# Patient Record
Sex: MALE | URBAN - METROPOLITAN AREA
[De-identification: names, ages, dates, MRNs, and addresses within clinical notes are randomized per-mention and may not be internally consistent; named-entity substitution may affect disease eponyms.]

---

## 2023-06-27 ENCOUNTER — POST-OPERATIVE VISIT (OUTPATIENT)
Facility: LOCATION | Age: 73
End: 2023-06-27
Payer: COMMERCIAL

## 2023-06-27 DIAGNOSIS — Z48.810 ENCOUNTER FOR SURGICAL AFTERCARE FOLLOWING SURGERY ON A SENSE ORGAN: ICD-10-CM

## 2023-06-27 DIAGNOSIS — H40.1132 PRIMARY OPEN-ANGLE GLAUCOMA, `BILATERAL`, M`OD`ERATE STAGE: Primary | ICD-10-CM

## 2023-06-27 PROCEDURE — 99024 POSTOP FOLLOW-UP VISIT: CPT | Performed by: OPHTHALMOLOGY

## 2023-06-27 ASSESSMENT — INTRAOCULAR PRESSURE
OD: 16
OD: 17

## 2023-06-27 NOTE — IMPRESSION/PLAN
Impression: Primary open-angle glaucoma, `BILATERAL`, m`OD`erate stage. Plan: Gonio: 08/19/21  VF: 02/28/23  RNFL: 05/12/22  Optos: 08/19/21 Primary Open Angle Glaucoma OU (? angle recession OD) s/p GATT OD s/p CE/[place] OD No FH.  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
CCT:[id number]/558 Gonio open to ss 1+PTM OU OD ?otomy VF OD dense sup arc/paracentral dep OS: scat  (OD prog  OS: stable) RNFL: OD: sev thinning, OS: no thin (Stable OU) CCM Latanoprost QHS OU, Dorozolamide/Timolol BID OD, rhopressa qhs od.,  


 Recommend initial baseline OCT of the nerves, CCT, Visual Fields, and ON Photos. Careful observation of intraocular pressures, anatomical, and functional tests are recommended. Patient understands that permanant blindness can occur without adequate pressure control and observation. Compliance is strongly urged. The patient is to call back with any worsening of symptoms or concerns.

## 2023-06-27 NOTE — IMPRESSION/PLAN
Impression: S/P CE/Standard IOL / [place] OD - 1 Day. Encounter for surgical aftercare following surgery on a sense organ  Z48.810.  Plan: [facility] gtts as directed

## 2023-07-03 ENCOUNTER — POST-OPERATIVE VISIT (OUTPATIENT)
Facility: LOCATION | Age: 73
End: 2023-07-03
Payer: COMMERCIAL

## 2023-07-03 DIAGNOSIS — Z48.810 ENCOUNTER FOR SURGICAL AFTERCARE FOLLOWING SURGERY ON A SENSE ORGAN: ICD-10-CM

## 2023-07-03 DIAGNOSIS — H40.1132 PRIMARY OPEN-ANGLE GLAUCOMA, `BILATERAL`, M`OD`ERATE STAGE: Primary | ICD-10-CM

## 2023-07-03 PROCEDURE — 99024 POSTOP FOLLOW-UP VISIT: CPT | Performed by: OPHTHALMOLOGY

## 2023-07-03 RX ORDER — BRIMONIDINE TARTRATE 2 MG/ML
0.2 % SOLUTION/ DROPS OPHTHALMIC
Qty: 10 | Refills: 4 | Status: ACTIVE
Start: 2023-07-03

## 2023-07-03 ASSESSMENT — INTRAOCULAR PRESSURE
OS: 20
OS: 19
OD: 22
OD: 15

## 2023-07-03 NOTE — IMPRESSION/PLAN
Impression: S/P CE/Standard IOL / [place] OD - 7 Days. Encounter for surgical aftercare following surgery on a sense organ  Z48.810.  Plan: [facility] gtts as directed

## 2023-07-03 NOTE — IMPRESSION/PLAN
Impression: Primary open-angle glaucoma, `BILATERAL`, m`OD`erate stage. Plan: Gonio: 08/19/21  VF: 02/28/23  RNFL: 05/12/22  Optos: 08/19/21 DFE: 02/28/23 Primary Open Angle Glaucoma OU (? angle recession OD) s/p GATT OD s/p CE/[place] OD No FH.  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
CCT:[id number]/558 Gonio open to ss 1+PTM OU OD ?otomy VF OD dense sup arc/paracentral dep OS: scat  (OD prog  OS: stable) RNFL: OD: sev thinning, OS: no thin (Stable OU) CCM Latanoprost QHS OU, Dorozolamide/Timolol BID OD, rhopressa qhs od.,  [facility] gtts as directed, Start Brimonidine BID OD Recommend initial baseline OCT of the nerves, CCT, Visual Fields, and ON Photos. Careful observation of intraocular pressures, anatomical, and functional tests are recommended. Patient understands that permanant blindness can occur without adequate pressure control and observation. Compliance is strongly urged. The patient is to call back with any worsening of symptoms or concerns.

## 2023-08-29 ENCOUNTER — OFFICE VISIT (OUTPATIENT)
Facility: LOCATION | Age: 73
End: 2023-08-29
Payer: COMMERCIAL

## 2023-08-29 DIAGNOSIS — H40.1132 PRIMARY OPEN-ANGLE GLAUCOMA, BILATERAL, MODERATE STAGE: Primary | ICD-10-CM

## 2023-08-29 DIAGNOSIS — Z48.810 ENCOUNTER FOR SURGICAL AFTERCARE FOLLOWING SURGERY ON A SENSE ORGAN: ICD-10-CM

## 2023-08-29 PROCEDURE — 99024 POSTOP FOLLOW-UP VISIT: CPT | Performed by: OPHTHALMOLOGY

## 2023-08-29 ASSESSMENT — INTRAOCULAR PRESSURE
OD: 15
OS: 16
OD: 16
OS: 18

## 2024-04-02 ENCOUNTER — OFFICE VISIT (OUTPATIENT)
Facility: LOCATION | Age: 74
End: 2024-04-02
Payer: COMMERCIAL

## 2024-04-02 DIAGNOSIS — H52.13 MYOPIA, BILATERAL: ICD-10-CM

## 2024-04-02 DIAGNOSIS — H40.1132 PRIMARY OPEN-ANGLE GLAUCOMA, BILATERAL, MODERATE STAGE: Primary | ICD-10-CM

## 2024-04-02 PROCEDURE — 99213 OFFICE O/P EST LOW 20 MIN: CPT | Performed by: OPHTHALMOLOGY

## 2024-04-02 PROCEDURE — 92133 CPTRZD OPH DX IMG PST SGM ON: CPT | Performed by: OPHTHALMOLOGY

## 2024-04-02 RX ORDER — BRIMONIDINE TARTRATE 2 MG/ML
0.2 % SOLUTION/ DROPS OPHTHALMIC
Qty: 10 | Refills: 4 | Status: INACTIVE
Start: 2024-04-02 | End: 2024-06-30

## 2024-04-02 RX ORDER — NETARSUDIL 0.2 MG/ML
0.02 % SOLUTION/ DROPS OPHTHALMIC; TOPICAL
Qty: 2.5 | Refills: 4 | Status: INACTIVE
Start: 2024-04-02 | End: 2024-06-30

## 2024-04-02 RX ORDER — DORZOLAMIDE HYDROCHLORIDE AND TIMOLOL MALEATE 20; 5 MG/ML; MG/ML
SOLUTION/ DROPS OPHTHALMIC
Qty: 15 | Refills: 4 | Status: INACTIVE
Start: 2024-04-02 | End: 2024-06-30

## 2024-04-02 RX ORDER — LATANOPROST 50 UG/ML
0.005 % SOLUTION OPHTHALMIC
Qty: 2.5 | Refills: 4 | Status: INACTIVE
Start: 2024-04-02 | End: 2024-06-30

## 2024-04-02 ASSESSMENT — VISUAL ACUITY
OD: 20/25
OS: 20/25

## 2024-04-02 ASSESSMENT — KERATOMETRY
OD: 40.56
OS: 42.69

## 2024-04-02 ASSESSMENT — INTRAOCULAR PRESSURE
OD: 16
OS: 19

## 2024-11-18 ENCOUNTER — OFFICE VISIT (OUTPATIENT)
Facility: LOCATION | Age: 74
End: 2024-11-18
Payer: COMMERCIAL

## 2024-11-18 DIAGNOSIS — H04.123 DRY EYE SYNDROME OF BILATERAL LACRIMAL GLANDS: ICD-10-CM

## 2024-11-18 DIAGNOSIS — H25.812 COMBINED FORMS OF AGE-RELATED CATARACT, LEFT EYE: ICD-10-CM

## 2024-11-18 DIAGNOSIS — H26.491 OTHER SECONDARY CATARACT, RIGHT EYE: ICD-10-CM

## 2024-11-18 DIAGNOSIS — H40.1132 PRIMARY OPEN-ANGLE GLAUCOMA, `BILATERAL`, M`OD`ERATE STAGE: Primary | ICD-10-CM

## 2024-11-18 PROCEDURE — 92083 EXTENDED VISUAL FIELD XM: CPT | Performed by: OPHTHALMOLOGY

## 2024-11-18 PROCEDURE — 99214 OFFICE O/P EST MOD 30 MIN: CPT | Performed by: OPHTHALMOLOGY

## 2024-11-18 RX ORDER — BRIMONIDINE TARTRATE 2 MG/ML
0.2 % SOLUTION/ DROPS OPHTHALMIC
Qty: 10 | Refills: 4 | Status: INACTIVE
Start: 2024-11-18 | End: 2025-02-15

## 2024-11-18 ASSESSMENT — INTRAOCULAR PRESSURE
OD: 17
OS: 19
OD: 16
OS: 18

## 2025-07-28 ENCOUNTER — OFFICE VISIT (OUTPATIENT)
Facility: LOCATION | Age: 75
End: 2025-07-28
Payer: COMMERCIAL

## 2025-07-28 DIAGNOSIS — H40.1132 PRIMARY OPEN-ANGLE GLAUCOMA, `BILATERAL`, M`OD`ERATE STAGE: Primary | ICD-10-CM

## 2025-07-28 DIAGNOSIS — H04.123 DRY EYE SYNDROME OF BILATERAL LACRIMAL GLANDS: ICD-10-CM

## 2025-07-28 DIAGNOSIS — H26.491 OTHER SECONDARY CATARACT, RIGHT EYE: ICD-10-CM

## 2025-07-28 DIAGNOSIS — H25.812 COMBINED FORMS OF AGE-RELATED CATARACT, LEFT EYE: ICD-10-CM

## 2025-07-28 PROCEDURE — 99214 OFFICE O/P EST MOD 30 MIN: CPT | Performed by: OPHTHALMOLOGY

## 2025-07-28 PROCEDURE — 92133 CPTRZD OPH DX IMG PST SGM ON: CPT | Performed by: OPHTHALMOLOGY

## 2025-07-28 RX ORDER — NETARSUDIL 0.2 MG/ML
0.02 % SOLUTION/ DROPS OPHTHALMIC; TOPICAL
Qty: 7.5 | Refills: 3 | Status: ACTIVE
Start: 2025-07-28

## 2025-07-28 RX ORDER — LATANOPROST 50 UG/ML
0.005 % SOLUTION OPHTHALMIC
Qty: 15 | Refills: 3 | Status: ACTIVE
Start: 2025-07-28

## 2025-07-28 RX ORDER — DORZOLAMIDE HYDROCHLORIDE AND TIMOLOL MALEATE 20; 5 MG/ML; MG/ML
SOLUTION/ DROPS OPHTHALMIC
Qty: 15 | Refills: 4 | Status: INACTIVE
Start: 2025-07-28 | End: 2025-10-25

## 2025-07-28 RX ORDER — BRIMONIDINE TARTRATE 2 MG/ML
0.2 % SOLUTION/ DROPS OPHTHALMIC
Qty: 10 | Refills: 4 | Status: ACTIVE
Start: 2025-07-28

## 2025-07-28 ASSESSMENT — INTRAOCULAR PRESSURE
OD: 16
OS: 19
OD: 19
OS: 18

## 2025-08-18 ENCOUNTER — OFFICE VISIT (OUTPATIENT)
Facility: LOCATION | Age: 75
End: 2025-08-18
Payer: COMMERCIAL

## 2025-08-18 DIAGNOSIS — H26.491 OTHER SECONDARY CATARACT, RIGHT EYE: ICD-10-CM

## 2025-08-18 DIAGNOSIS — H04.123 DRY EYE SYNDROME OF BILATERAL LACRIMAL GLANDS: ICD-10-CM

## 2025-08-18 DIAGNOSIS — H40.1132 PRIMARY OPEN-ANGLE GLAUCOMA, `BILATERAL`, M`OD`ERATE STAGE: Primary | ICD-10-CM

## 2025-08-18 DIAGNOSIS — H52.13 MYOPIA, BILATERAL: ICD-10-CM

## 2025-08-18 PROCEDURE — 99214 OFFICE O/P EST MOD 30 MIN: CPT | Performed by: OPHTHALMOLOGY

## 2025-08-18 ASSESSMENT — KERATOMETRY
OS: 42.37
OD: 42.56

## 2025-08-18 ASSESSMENT — VISUAL ACUITY
OD: 20/40
OS: 20/25

## 2025-08-18 ASSESSMENT — INTRAOCULAR PRESSURE
OD: 16
OS: 19